# Patient Record
Sex: FEMALE | Race: AMERICAN INDIAN OR ALASKA NATIVE
[De-identification: names, ages, dates, MRNs, and addresses within clinical notes are randomized per-mention and may not be internally consistent; named-entity substitution may affect disease eponyms.]

---

## 2017-12-14 ENCOUNTER — HOSPITAL ENCOUNTER (EMERGENCY)
Dept: HOSPITAL 5 - ED | Age: 27
LOS: 1 days | Discharge: HOME | End: 2017-12-15
Payer: COMMERCIAL

## 2017-12-14 VITALS — DIASTOLIC BLOOD PRESSURE: 86 MMHG | SYSTOLIC BLOOD PRESSURE: 120 MMHG

## 2017-12-14 DIAGNOSIS — J40: Primary | ICD-10-CM

## 2017-12-14 DIAGNOSIS — M94.0: ICD-10-CM

## 2017-12-14 LAB
ANION GAP SERPL CALC-SCNC: 18 MMOL/L
BASOPHILS NFR BLD AUTO: 0.7 % (ref 0–1.8)
BUN SERPL-MCNC: 5 MG/DL (ref 7–17)
BUN/CREAT SERPL: 13 %
CALCIUM SERPL-MCNC: 9.3 MG/DL (ref 8.4–10.2)
CHLORIDE SERPL-SCNC: 102.8 MMOL/L (ref 98–107)
CO2 SERPL-SCNC: 22 MMOL/L (ref 22–30)
EOSINOPHIL NFR BLD AUTO: 5.1 % (ref 0–4.3)
GLUCOSE SERPL-MCNC: 81 MG/DL (ref 65–100)
HCT VFR BLD CALC: 37.4 % (ref 30.3–42.9)
HGB BLD-MCNC: 12.3 GM/DL (ref 10.1–14.3)
MCH RBC QN AUTO: 32 PG (ref 28–32)
MCHC RBC AUTO-ENTMCNC: 33 % (ref 30–34)
MCV RBC AUTO: 96 FL (ref 79–97)
PLATELET # BLD: 207 K/MM3 (ref 140–440)
POTASSIUM SERPL-SCNC: 4 MMOL/L (ref 3.6–5)
RBC # BLD AUTO: 3.9 M/MM3 (ref 3.65–5.03)
SODIUM SERPL-SCNC: 139 MMOL/L (ref 137–145)
WBC # BLD AUTO: 5.3 K/MM3 (ref 4.5–11)

## 2017-12-14 PROCEDURE — 84484 ASSAY OF TROPONIN QUANT: CPT

## 2017-12-14 PROCEDURE — 84703 CHORIONIC GONADOTROPIN ASSAY: CPT

## 2017-12-14 PROCEDURE — 99284 EMERGENCY DEPT VISIT MOD MDM: CPT

## 2017-12-14 PROCEDURE — 80074 ACUTE HEPATITIS PANEL: CPT

## 2017-12-14 PROCEDURE — 87400 INFLUENZA A/B EACH AG IA: CPT

## 2017-12-14 PROCEDURE — 85025 COMPLETE CBC W/AUTO DIFF WBC: CPT

## 2017-12-14 PROCEDURE — 93010 ELECTROCARDIOGRAM REPORT: CPT

## 2017-12-14 PROCEDURE — 36415 COLL VENOUS BLD VENIPUNCTURE: CPT

## 2017-12-14 PROCEDURE — 80048 BASIC METABOLIC PNL TOTAL CA: CPT

## 2017-12-14 PROCEDURE — 71020: CPT

## 2017-12-14 PROCEDURE — 85379 FIBRIN DEGRADATION QUANT: CPT

## 2017-12-14 PROCEDURE — 93005 ELECTROCARDIOGRAM TRACING: CPT

## 2017-12-14 PROCEDURE — 96372 THER/PROPH/DIAG INJ SC/IM: CPT

## 2017-12-14 NOTE — EMERGENCY DEPARTMENT REPORT
- General


Chief Complaint: Upper Respiratory Infection


Stated Complaint: COUGH; CP; URI SX


Time Seen by Provider: 12/14/17 23:04


Source: patient


Mode of arrival: Ambulatory


Limitations: No Limitations





- History of Present Illness


Initial Comments: 





This is a 27-year-old female nontoxic, well nourished in appearance, no acute 

signs of distress presents to the ED with c/o of nonproductive cough x1 weeks 

and chest pain. Patient stated she was seen last week and was diagnosed with 

URI and received azithromycin.  Patient stated symptoms such as rhinorrhea, 

productive cough has subsided the patient is complaining of nonproductive cough 

now.  SHe stated THAT this has resolved only complaint now she has is 

nonproductive cough and chest pain during coughing episode.  The patient denies 

any shortness of breath, wheezing, Pain, calf tenderness, hemoptysis, fever, 

chills, nausea, vomiting, headache or stiff neck.  Patient states she had 

recent travel of 6 hours on a car but stated she stopped every 2-3 hours. 

Patient denies travels outside of country.  Patient denies any drug allergies 

or past medical history. Patient denies radiation of chest pain.  Chest pain 

location is mid sternum.


MD Complaint: cough


-: week(s) (1)


Severity: mild


Severity scale (0 -10): 8


Quality: aching


Consistency: constant, intermittent (chest pain during cough)


Improves With: nothing


Worsens With: nothing


Associated Symptoms: cough.  denies: fever, chills, myalgias, diaphoresis, 

headache, rhinorrhea, nasal congestion, sore throat, stiff neck, chest pain, 

shortness of breath, abdominal pain, nausea, vomiting, diarrhea, dysuria, rash, 

confusion, right sweats, weight loss, epistaxis, hoarseness, ear pain





- Related Data


 Previous Rx's











 Medication  Instructions  Recorded  Last Taken  Type


 


Ibuprofen [Motrin] 600 mg PO Q8H PRN #30 tablet 12/14/17 Unknown Rx


 


predniSONE [Deltasone] 40 mg PO QDAY #5 tab 12/14/17 Unknown Rx


 


Benzonatate [Tessalon Perle] 100 mg PO Q8H #30 capsule 12/15/17 Unknown Rx











 Allergies











Allergy/AdvReac Type Severity Reaction Status Date / Time


 


No Known Allergies Allergy   Unverified 12/14/17 20:14














ED Review of Systems


ROS: 


Stated complaint: COUGH; CP; URI SX


Other details as noted in HPI





Constitutional: denies: chills, fever


Eyes: denies: eye pain, eye discharge, vision change


ENT: denies: ear pain, throat pain


Respiratory: cough.  denies: shortness of breath, wheezing


Cardiovascular: chest pain.  denies: palpitations


Endocrine: no symptoms reported


Gastrointestinal: denies: abdominal pain, nausea, diarrhea


Genitourinary: denies: urgency, dysuria, discharge


Musculoskeletal: denies: back pain, joint swelling, arthralgia


Skin: denies: rash, lesions


Neurological: denies: headache, weakness, paresthesias


Psychiatric: denies: anxiety, depression


Hematological/Lymphatic: denies: easy bleeding, easy bruising





ED Past Medical Hx





- Social History


Smoking Status: Former Smoker


Substance Use Type: Alcohol





- Medications


Home Medications: 


 Home Medications











 Medication  Instructions  Recorded  Confirmed  Last Taken  Type


 


Ibuprofen [Motrin] 600 mg PO Q8H PRN #30 tablet 12/14/17  Unknown Rx


 


predniSONE [Deltasone] 40 mg PO QDAY #5 tab 12/14/17  Unknown Rx


 


Benzonatate [Tessalon Perle] 100 mg PO Q8H #30 capsule 12/15/17  Unknown Rx














ED Physical Exam





- General


Limitations: No Limitations


General appearance: alert, in no apparent distress





- Head


Head exam: Present: atraumatic, normocephalic, normal inspection





- Eye


Eye exam: Present: normal appearance, PERRL, EOMI.  Absent: scleral icterus, 

conjunctival injection, nystagmus, periorbital swelling, periorbital tenderness


Pupils: Present: normal accommodation





- ENT


ENT exam: Present: normal exam, normal orophraynx, mucous membranes moist, TM's 

normal bilaterally, normal external ear exam





- Neck


Neck exam: Present: normal inspection, full ROM.  Absent: tenderness, 

meningismus, lymphadenopathy, thyromegaly





- Respiratory


Respiratory exam: Present: normal lung sounds bilaterally, chest wall 

tenderness (midsternum).  Absent: respiratory distress, wheezes, rales, rhonchi

, stridor, accessory muscle use, decreased breath sounds, prolonged expiratory





- Cardiovascular


Cardiovascular Exam: Present: regular rate, normal rhythm, normal heart sounds.

  Absent: bradycardia, tachycardia, irregular rhythm, systolic murmur, 

diastolic murmur, rubs, gallop





- GI/Abdominal


GI/Abdominal exam: Present: soft, normal bowel sounds.  Absent: distended, 

tenderness, guarding, rebound, rigid, diminished bowel sounds





- Rectal


Rectal exam: Present: deferred





- Extremities Exam


Extremities exam: Present: normal inspection, full ROM, normal capillary 

refill.  Absent: tenderness, pedal edema, joint swelling, calf tenderness





- Back Exam


Back exam: Present: normal inspection, full ROM.  Absent: tenderness, CVA 

tenderness (R), CVA tenderness (L), muscle spasm, paraspinal tenderness, 

vertebral tenderness, rash noted





- Neurological Exam


Neurological exam: Present: alert, oriented X3, CN II-XII intact, normal gait, 

reflexes normal





- Psychiatric


Psychiatric exam: Present: normal affect, normal mood





- Skin


Skin exam: Present: warm, dry, intact, normal color.  Absent: rash





ED Course


 Vital Signs











  12/14/17 12/14/17





  20:10 21:33


 


Temperature 99.3 F 


 


Pulse Rate 86 


 


Respiratory 20 20





Rate  


 


Blood Pressure 120/86 


 


O2 Sat by Pulse 99 





Oximetry  














- Reevaluation(s)


Reevaluation #1: 





12/14/17 23:47


Patient is speaking in full sentences with no signs of distress noted.





ED Medical Decision Making





- Lab Data


Result diagrams: 


 12/14/17 23:16





 12/14/17 23:16





- EKG Data


When compared to previous EKG there are: no significant change


Interpretation: no acute changes, normal EKG





- Radiology Data


Radiology results: report reviewed


interpreted by me: 





Radiologist





Normal exam chest





- Medical Decision Making





This is a 27-year-old female that presents with costochondritis and bronchitis.

  Patient is stable and was examined by me.  EKG obtained with normal sinus 

rhythm and no ST abnormalities.  CBC, BMP, troponin obtained within normal 

limits. D-dimer negative.  Negative pregnancy test.  Chest x-ray obtained and 

reviewed by radiologist with normal exam.  Patient notified of x-ray results 

and laboratory findings with noted by the patient.  Patient received Solu-

Medrol 125 mg in the ED.  Patient received Tessalon Perles and Motrin which 

patient stated symptoms of coughing and chest pain is improving and has 

subsided.  She is discharged with prednisone.  Patient was instructed Follow-up 

with a primary care doctor in 3-5 days or if symptoms worsen and continue 

return to emergency room as soon as possible.  At time time of discharge, the 

patient does not seem toxic or ill in appearance.  No acute signs of distress 

noted.  Patient agrees to discharge treatment plan of care.  No further 

questions noted by the patient. Patient showed me her zpack that she just 

finished. 


Critical care attestation.: 


If time is entered above; I have spent that time in minutes in the direct care 

of this critically ill patient, excluding procedure time.








ED Disposition


Clinical Impression: 


 Bronchitis, Costochondritis





Disposition: DC-01 TO HOME OR SELFCARE


Is pt being admited?: No


Does the pt Need Aspirin: No


Condition: Stable


Instructions:  Ibuprofen (By mouth), Prednisone (By mouth), Costochondritis (ED)

, Acute Bronchitis (ED)


Additional Instructions: 


Follow-up with a primary care doctor in 3-5 days or if symptoms worsen and 

continue return to emergency room as soon as possible. 


Prescriptions: 


Benzonatate [Tessalon Perle] 100 mg PO Q8H #30 capsule


Ibuprofen [Motrin] 600 mg PO Q8H PRN #30 tablet


 PRN Reason: Pain


predniSONE [Deltasone] 40 mg PO QDAY #5 tab


Referrals: 


KAROLYN GILES MD [Primary Care Provider] - 3-5 Days


KRIS BORGES MD [Staff Physician] - 3-5 Days


[facility] [Outside] - 3-5 Days


[facility] [Outside] - 3-5 Days


Forms:  Work/School Release Form(ED)

## 2017-12-14 NOTE — XRAY REPORT
FINAL REPORT



PROCEDURE:  XR CHEST ROUTINE 2V



TECHNIQUE:  PA and lateral chest radiographs were obtained. CPT

12087







HISTORY:  cough 



COMPARISON:  No prior studies are available for comparison.



FINDINGS:  

Heart: Normal.



Mediastinum/Vessels: Normal.



Lungs/Pleural space: Normal.



Bony thorax: No acute osseous abnormality.



Other: 



IMPRESSION:  

Negative examination.

## 2017-12-15 LAB
ALBUMIN SERPL-MCNC: 4.4 G/DL (ref 3.9–5)
ALBUMIN/GLOB SERPL: 1.8 %
ALP SERPL-CCNC: 39 UNITS/L (ref 35–129)
ALT SERPL-CCNC: 14 UNITS/L (ref 7–56)
BILIRUB DIRECT SERPL-MCNC: < 0.2 MG/DL (ref 0–0.2)
BILIRUB INDIRECT SERPL-MCNC: 0.1 MG/DL
BILIRUB SERPL-MCNC: 0.3 MG/DL (ref 0.1–1.2)
PROT SERPL-MCNC: 6.8 G/DL (ref 6.3–8.2)

## 2019-02-23 ENCOUNTER — HOSPITAL ENCOUNTER (EMERGENCY)
Dept: HOSPITAL 5 - ED | Age: 29
Discharge: HOME | End: 2019-02-23
Payer: MEDICAID

## 2019-02-23 VITALS — DIASTOLIC BLOOD PRESSURE: 89 MMHG | SYSTOLIC BLOOD PRESSURE: 117 MMHG

## 2019-02-23 DIAGNOSIS — N76.0: Primary | ICD-10-CM

## 2019-02-23 DIAGNOSIS — N39.0: ICD-10-CM

## 2019-02-23 DIAGNOSIS — F17.200: ICD-10-CM

## 2019-02-23 DIAGNOSIS — B96.89: ICD-10-CM

## 2019-02-23 LAB
BACTERIA #/AREA URNS HPF: (no result) /HPF
BILIRUB UR QL STRIP: (no result)
BLOOD UR QL VISUAL: (no result)
MUCOUS THREADS #/AREA URNS HPF: (no result) /HPF
PH UR STRIP: 6 [PH] (ref 5–7)
RBC #/AREA URNS HPF: 26 /HPF (ref 0–6)
UROBILINOGEN UR-MCNC: < 2 MG/DL (ref ?–2)
WBC #/AREA URNS HPF: 134 /HPF (ref 0–6)

## 2019-02-23 PROCEDURE — 99283 EMERGENCY DEPT VISIT LOW MDM: CPT

## 2019-02-23 PROCEDURE — 87210 SMEAR WET MOUNT SALINE/INK: CPT

## 2019-02-23 PROCEDURE — 81001 URINALYSIS AUTO W/SCOPE: CPT

## 2019-02-23 PROCEDURE — 81025 URINE PREGNANCY TEST: CPT

## 2019-02-23 PROCEDURE — 87591 N.GONORRHOEAE DNA AMP PROB: CPT

## 2019-02-23 PROCEDURE — 96372 THER/PROPH/DIAG INJ SC/IM: CPT

## 2019-02-23 NOTE — EMERGENCY DEPARTMENT REPORT
ED Dysuria HPI





- HPI


Chief Complaint: Urogenital-Female


Stated Complaint: PAINFUL URINATION


Time Seen by Provider: 19 07:51


Duration: 3 Days


Location of Discomfort: Suprapubic


Severity: Mild


Symptoms: Dysuria: Yes, Frequency: No, Flank Pain: No, Fever: No, Hematuria: No,

Abdominal Pain: No, Previous UTI's: No


Other History: H and is a 28-year-old -American female comes to the 

emergency room complaining of vaginal discharge.  She is concerned because she 

just found out that way for many years just close to her is .  Patient 

did call her OB/GYN and they were unable to see her.  Patient does have a 

history of BV.  Patient is having dysuria as well as the vaginal discharge.





ED Review of Systems


ROS: 


Stated complaint: PAINFUL URINATION


Other details as noted in HPI





Comment: All other systems reviewed and negative


Constitutional: denies: chills


Eyes: denies: eye pain


ENT: denies: ear pain


Respiratory: denies: cough


Cardiovascular: denies: palpitations


Endocrine: denies: flushing


Gastrointestinal: denies: abdominal pain, nausea, vomiting


Genitourinary: as per HPI, dysuria, discharge.  denies: urgency, frequency, 

hematuria


Musculoskeletal: denies: back pain, joint swelling, arthralgia


Skin: denies: lesions, change in color


Neurological: denies: weakness


Psychiatric: denies: anxiety


Hematological/Lymphatic: denies: easy bleeding





ED Past Medical Hx





- Past Medical History


Previous Medical History?: No





- Surgical History


Past Surgical History?: Yes


Additional Surgical History:  x2





- Family History


Family history: no significant





- Social History


Smoking Status: Current Every Day Smoker


Substance Use Type: Alcohol





- Medications


Home Medications: 


                                Home Medications











 Medication  Instructions  Recorded  Confirmed  Last Taken  Type


 


Fluconazole [Diflucan] 150 mg PO ONCE #1 tablet 19  Unknown Rx


 


Sulfamethoxazole/Trimethoprim 1 each PO BID #6 tablet 19  Unknown Rx





[Bactrim DS TAB]     


 


metroNIDAZOLE [Flagyl] 500 mg PO Q12HR #20 tab 19  Unknown Rx














Dysuria Exam





- Exam


General: 


Vital signs noted. No distress. Alert and acting appropriately.





Exam: Yes Moist Mucous Membranes, No CVA Tenderness, No Abdominal Tenderness, No

 Rigidity or Guarding


Labs: 


                                   Lab Results











  19 Range/Units





  07:31 


 


Urine Color  Yellow  (Yellow)  


 


Urine Turbidity  Slightly-cloudy  (Clear)  


 


Urine pH  6.0  (5.0-7.0)  


 


Ur Specific Gravity  1.023  (1.003-1.030)  


 


Urine Protein  100 mg/dl  (Negative)  mg/dL


 


Urine Glucose (UA)  Neg  (Negative)  mg/dL


 


Urine Ketones  Neg  (Negative)  mg/dL


 


Urine Blood  Mod  (Negative)  


 


Urine Nitrite  Neg  (Negative)  


 


Ur Reducing Substances  Not Reportable  


 


Urine Bilirubin  Neg  (Negative)  


 


Urine Ictotest  Not Reportable  


 


Urine Urobilinogen  < 2.0  (<2.0)  mg/dL


 


Ur Leukocyte Esterase  Sm  (Negative)  


 


Urine WBC (Auto)  134.0 H  (0.0-6.0)  /HPF


 


Urine RBC (Auto)  26.0  (0.0-6.0)  /HPF


 


U Epithel Cells (Auto)  1.0  (0-13.0)  /HPF


 


Urine Bacteria (Auto)  4+  (Negative)  /HPF


 


Urine Mucus  Few  /HPF


 


Urine HCG, Qual  Negative  (Negative)  














ED Course


                                   Vital Signs











  19





  06:53 07:16


 


Temperature 98.0 F 98 F


 


Pulse Rate 90 96 H


 


Respiratory 18 18





Rate  


 


Blood Pressure 117/89 117/89


 


O2 Sat by Pulse 100 98





Oximetry  














ED Medical Decision Making





- Medical Decision Making





Labs











  19





  07:31


 


Urine Color  Yellow


 


Urine Turbidity  Slightly-cloudy


 


Urine pH  6.0


 


Ur Specific Gravity  1.023


 


Urine Protein  100 mg/dl


 


Urine Glucose (UA)  Neg


 


Urine Ketones  Neg


 


Urine Blood  Mod


 


Urine Nitrite  Neg


 


Ur Reducing Substances  Not Reportable


 


Urine Bilirubin  Neg


 


Urine Ictotest  Not Reportable


 


Urine Urobilinogen  < 2.0


 


Ur Leukocyte Est


rase  Sm


 


Urine WBC (Auto)  134.0 H


 


Urine RBC (Auto)  26.0


 


U Epithel Cells (Auto)  1.0


 


Urine Bacteria (Auto)  4+


 


Urine Mucus  Few


 


Urine HCG, Qual  Negative








Labs











  19





  07:31


 


Urine Color  Yellow


 


Urine Turbidity  Slightly-cloudy


 


Urine pH  6.0


 


Ur Specific Gravity  1.023


 


Urine Protein  100 mg/dl


 


Urine Glucose (UA)  Neg


 


Urine Ketones  Neg


 


Urine Blood  Mod


 


Urine Nitrite  Neg


 


Ur Reducing Substances  Not Reportable


 


Urine Bilirubin  Neg


 


Urine Ictotest  Not Reportable


 


Urine Urobilinogen  < 2.0


 


Ur Leukocyte Esterase  Sm


 


Urine WBC (Auto)  134.0 H


 


Urine RBC (Auto)  26.0


 


U Epithel Cells (Auto)  1.0


 


Urine Bacteria (Auto)  4+


 


Urine Mucus  Few


 


Urine HCG, Qual  Negative








Discussed the urinary findings with the patient.  Also wet prep was positive for

 clue cells.  Negative trichomoniasis negative use.  





Patient is very concerned about STDs so she will be treated empirically.  I 

suspected the patient in addition to the BV has a component of a urinary tract 

infection given her dysuria and frequency.  It may be that we found the BV 

incidentally as we get the wet prep.  Discharge on exam was not malodorous.  


Critical care attestation.: 


If time is entered above; I have spent that time in minutes in the direct care 

of this critically ill patient, excluding procedure time.








ED Disposition


Clinical Impression: 


 Bacterial vaginosis, Vaginitis, UTI (urinary tract infection)





Disposition: DC-01 TO HOME OR SELFCARE


Is pt being admited?: No


Does the pt Need Aspirin: No


Condition: Stable


Instructions:  Sexually Transmitted Diseases (ED), Safe Sex (ED)


Prescriptions: 


Fluconazole [Diflucan] 150 mg PO ONCE #1 tablet


metroNIDAZOLE [Flagyl] 500 mg PO Q12HR #20 tab


Sulfamethoxazole/Trimethoprim [Bactrim DS TAB] 1 each PO BID #6 tablet


Referrals: 


[facility], MD [Primary Care Provider] - 3-5 Days


Forms:  STI Treatment and Prevention


Time of Disposition: 08:23

## 2019-04-08 ENCOUNTER — HOSPITAL ENCOUNTER (EMERGENCY)
Dept: HOSPITAL 5 - ED | Age: 29
Discharge: HOME | End: 2019-04-08
Payer: MEDICAID

## 2019-04-08 VITALS — SYSTOLIC BLOOD PRESSURE: 109 MMHG | DIASTOLIC BLOOD PRESSURE: 64 MMHG

## 2019-04-08 DIAGNOSIS — N39.0: Primary | ICD-10-CM

## 2019-04-08 LAB
BACTERIA #/AREA URNS HPF: (no result) /HPF
BILIRUB UR QL STRIP: (no result)
BLOOD UR QL VISUAL: (no result)
MUCOUS THREADS #/AREA URNS HPF: (no result) /HPF
PH UR STRIP: 7 [PH] (ref 5–7)
RBC #/AREA URNS HPF: > 182 /HPF (ref 0–6)
UROBILINOGEN UR-MCNC: 4 MG/DL (ref ?–2)
WBC #/AREA URNS HPF: > 182 /HPF (ref 0–6)

## 2019-04-08 PROCEDURE — 96372 THER/PROPH/DIAG INJ SC/IM: CPT

## 2019-04-08 PROCEDURE — 81025 URINE PREGNANCY TEST: CPT

## 2019-04-08 PROCEDURE — 81001 URINALYSIS AUTO W/SCOPE: CPT

## 2019-04-08 PROCEDURE — 99283 EMERGENCY DEPT VISIT LOW MDM: CPT

## 2019-04-08 NOTE — EMERGENCY DEPARTMENT REPORT
Blank Doc





- Documentation


Documentation: 





This is a 28-year-old female presents with dysyrua, hematuria, and urinary emma

quency.








This initial assessment/diagnostic orders/clinical plan/treatment(s) is/are 

subject to change based on patient's health status, clinical progression and re-

assessment by fellow clinical providers in the ED.  Further treatment and workup

at subsequent clinical providers discretion.  Patient/guardians urged not to 

elope from the ED as their condition may be serious if not clinically assessed 

and managed.  Initial orders include:


1- Patient sent to [facility] for further evaluation and treatment


2- UA

## 2019-04-08 NOTE — EMERGENCY DEPARTMENT REPORT
<RODY ZARATE - Last Filed: 19 17:37>





ED Female  HPI





- General


Chief complaint: Urogenital-Female


Stated complaint: UTI


Time Seen by Provider: 19 12:42


Source: patient


Mode of arrival: Ambulatory


Limitations: No Limitations





- History of Present Illness


Initial comments: 





Pt presents to the ED with c/o dysuria that began today. She has associated 

pelvic pressure and urinary frequency. She denies any vaginal discharge, 

itching, burning, or lesions. She states she is not sexually active and has not 

been in 5 months. The patient states she is not concerned for STDs. She denies 

any N/V or fever. She denies any PMHx and does not have any allergies. 





- Related Data


                                  Previous Rx's











 Medication  Instructions  Recorded  Last Taken  Type


 


Fluconazole [Diflucan] 150 mg PO ONCE #1 tablet 19 Unknown Rx


 


Sulfamethoxazole/Trimethoprim 1 each PO BID #6 tablet 19 Unknown Rx





[Bactrim DS TAB]    


 


metroNIDAZOLE [Flagyl] 500 mg PO Q12HR #20 tab 19 Unknown Rx











                                    Allergies











Allergy/AdvReac Type Severity Reaction Status Date / Time


 


No Known Allergies Allergy   Unverified 17 20:14














ED Review of Systems


Comment: All other systems reviewed and negative





ED Past Medical Hx





- Past Medical History


Previous Medical History?: No





- Surgical History


Past Surgical History?: Yes


Additional Surgical History:  x2





- Social History


Smoking Status: Never Smoker


Substance Use Type: None





- Medications


Home Medications: 


                                Home Medications











 Medication  Instructions  Recorded  Confirmed  Last Taken  Type


 


Fluconazole [Diflucan] 150 mg PO ONCE #1 tablet 19  Unknown Rx


 


Sulfamethoxazole/Trimethoprim 1 each PO BID #6 tablet 19  Unknown Rx





[Bactrim DS TAB]     


 


metroNIDAZOLE [Flagyl] 500 mg PO Q12HR #20 tab 19  Unknown Rx














ED Physical Exam





- General


Limitations: No Limitations


General appearance: alert, in no apparent distress





- Head


Head exam: Present: atraumatic, normocephalic





- Eye


Eye exam: Present: normal appearance





- ENT


ENT exam: Present: mucous membranes moist





- Respiratory


Respiratory exam: Absent: respiratory distress





- Cardiovascular


Cardiovascular Exam: Present: regular rate





- GI/Abdominal


GI/Abdominal exam: Present: soft.  Absent: distended, tenderness, guarding, 

rebound, rigid





- Back Exam


Back exam: Absent: CVA tenderness (R), CVA tenderness (L)





- Neurological Exam


Neurological exam: Present: alert, oriented X3





- Psychiatric


Psychiatric exam: Present: normal affect, normal mood





- Skin


Skin exam: Present: warm, dry, intact





ED Medical Decision Making





- Medical Decision Making





Pt presents to the ED with c/o dysuria that began today. She has associated 

pelvic pressure and urinary frequency. She denies any vaginal discharge, 

itching, burning, or lesions. She states she is not sexually active and has not 

been in 5 months. The patient states she is not concerned for STDs. She denies 

any N/V or fever. She denies any PMHx and does not have any allergies. UA shows 

>182 WBCs will give pt ceftriaxone injection while in the ED. No abd tenderness,

 no CVAT. VSS. WIll have pt follow up with her PCP in the next 2-3 days. Return 

to the ED for any new or worsening symptoms. 





ED Disposition


Clinical Impression: 


 UTI (urinary tract infection)





Disposition:  TO HOME OR SELFCARE


Is pt being admited?: No


Does the pt Need Aspirin: No


Condition: Stable


Instructions:  Urinary Tract Infection in Women (ED)


Additional Instructions: 


Please follow up with a primary care doctor in the next 2-3 days. Return to the 

emergency room for any new or worsening symptoms. 


Referrals: 


PRISCA JOLLEY MD [Primary Care Provider] - 2-3 Days


Time of Disposition: 17:43


Print Language: ENGLISH





<KAROLYN MANCIA - Last Filed: 19 01:52>





ED Review of Systems


ROS: 


Stated complaint: UTI


Other details as noted in HPI








ED Course


                                   Vital Signs











  19





  12:43


 


Temperature 98 F


 


Pulse Rate 69


 


Respiratory 16





Rate 


 


Blood Pressure 109/64


 


O2 Sat by Pulse 98





Oximetry 











Critical care attestation.: 


If time is entered above; I have spent that time in minutes in the direct care 

of this critically ill patient, excluding procedure time.

## 2019-12-24 ENCOUNTER — HOSPITAL ENCOUNTER (EMERGENCY)
Dept: HOSPITAL 5 - ED | Age: 29
Discharge: LEFT BEFORE DISCHARGE | End: 2019-12-24
Payer: MEDICAID